# Patient Record
(demographics unavailable — no encounter records)

---

## 2024-11-19 NOTE — REASON FOR VISIT
[Hospital Follow-Up] : a hospital follow-up for [Headache] : headache [Patient] : patient [Mother] : mother [FreeTextEntry2] : ED admission for headache (11/19/2024)

## 2024-11-19 NOTE — PHYSICAL EXAM
[Well-appearing] : well-appearing [Normocephalic] : normocephalic [No dysmorphic facial features] : no dysmorphic facial features [No ocular abnormalities] : no ocular abnormalities [Neck supple] : neck supple [Lungs clear] : lungs clear [Heart sounds regular in rate and rhythm] : heart sounds regular in rate and rhythm [Soft] : soft [Straight] : straight [No deformities] : no deformities [Alert] : alert [Well related, good eye contact] : well related, good eye contact [Conversant] : conversant [Normal speech and language] : normal speech and language [Follows instructions well] : follows instructions well [Pupils reactive to light and accommodation] : pupils reactive to light and accommodation [Full extraocular movements] : full extraocular movements [No nystagmus] : no nystagmus [No papilledema] : no papilledema [Normal facial sensation to light touch] : normal facial sensation to light touch [No facial asymmetry or weakness] : no facial asymmetry or weakness [Gross hearing intact] : gross hearing intact [Equal palate elevation] : equal palate elevation [Good shoulder shrug] : good shoulder shrug [Normal tongue movement] : normal tongue movement [Midline tongue, no fasciculations] : midline tongue, no fasciculations [Normal axial and appendicular muscle tone] : normal axial and appendicular muscle tone [Gets up on table without difficulty] : gets up on table without difficulty [Normal finger tapping and fine finger movements] : normal finger tapping and fine finger movements [No abnormal involuntary movements] : no abnormal involuntary movements [5/5 strength in proximal and distal muscles of arms and legs] : 5/5 strength in proximal and distal muscles of arms and legs [Walks and runs well] : walks and runs well [2+ biceps] : 2+ biceps [Triceps] : triceps [Knee jerks] : knee jerks [Localizes LT and temperature] : localizes LT and temperature [No dysmetria on FTNT] : no dysmetria on FTNT [Good walking balance] : good walking balance [Normal gait] : normal gait [Able to tandem well] : able to tandem well [Negative Romberg] : negative Romberg [Saccadic and smooth pursuits intact] : saccadic and smooth pursuits intact

## 2024-11-19 NOTE — PLAN
[FreeTextEntry1] : 15 y M with a history of worsening migraines requiring evaluation in emergency room today.  Noted to have episode of LOC in setting of blood draw.    Patient's last MRI was preformed 2 years ago, will order repeat at this time since headaches have increased in frequency and do not resolve in the interim.   Episode of LOC likely vasovagal secondary to nervousness from IV placement.  Description more consistent with convulsive syncope and not likely epileptic seizure.  Therefore does not require repeat EEG at this time  Blood glucose slightly elevated at time of evaluation in emergency room today.  Will repeat fasting glucose to determine if further workup is required through endocrinology.

## 2024-11-19 NOTE — HISTORY OF PRESENT ILLNESS
[FreeTextEntry1] : Osvaldo presents in follow-up of chronic headaches and after having a severe headache this morning.  At baseline he does endorse an increase in frequency of headaches since the onset of  school this year. He reports no headaches over the summer holidays and a return of symptoms 2 weeks into the school year. He continues to endorse that since the end of September/October he has been experiencing a daily low grade chronic headache (which he does not treat) that then progresses to a migraine needing him to stay home and treat with abortive medication, such as Advil. Patient is currently taking multivitamins and migraine vitamins daily and Advil about x1 a week for headaches. Osvaldo is being seen today for an evaluation after an acute onset severe headache that began this morning which prompted him to go to the ED for management. Patient states he had woken up this morning around 6AM, after a comfortable sleep without awakenings, and noted to have an unusually severe generalized throbbing headache with accompanied nausea and dizziness. Denies vomiting. Patient states he suffers from chronic headaches, however, does not recall any to be this severe in intensity. Patient's mother had immediately taken him to the ED where they had attempted to obtain an IV access, but where unsuccessful and when the IV was taken out the patient had an episode of syncope. Mother describes the event as the patient had falling backwards in the bed, arms had become flexed to his chest with mild shaking and his eyes had rolled back, possibly an episode of syncopal conversion. Mother states this lasted for about 15-30secs and patient had returned to baseline and had no post-ictal state. She adds the patient had a similar episode last year during a blood draw, however there was no muscle flexion. Mother reports the patient then received medication for the headache, nausea and dizziness but is uncertain of what it was called, possibly Zofran, which helped somewhat to relief the patient's symptoms. Currently patient continues to endorse a mild headache and jaw pain.   Patient reports that yesterday he had generalized abdominal pain, which resulted in a decreased appetite and feeling of tiredness. Patient stayed home from school yesterday to rest in case the abdominal pain was due to an infectious cause. Patient denies any fevers, vomiting or diarrhea. No academic or new stressors.

## 2025-02-03 NOTE — HISTORY OF PRESENT ILLNESS
[FreeTextEntry1] : Osvaldo has done well with Amitriptyline.  He has had few headaches of unclear frequency but feels that the intensity is significantly reduced.  Typically the headache will self resolve in a matter of minutes.  There are no triggers.  He is not having any side effects from Amitriptyline.

## 2025-02-03 NOTE — PHYSICAL EXAM
[Well-appearing] : well-appearing [Normocephalic] : normocephalic [No dysmorphic facial features] : no dysmorphic facial features [No ocular abnormalities] : no ocular abnormalities [Neck supple] : neck supple [Lungs clear] : lungs clear [Heart sounds regular in rate and rhythm] : heart sounds regular in rate and rhythm [Soft] : soft [Straight] : straight [No deformities] : no deformities [Alert] : alert [Well related, good eye contact] : well related, good eye contact [Conversant] : conversant [Normal speech and language] : normal speech and language [Follows instructions well] : follows instructions well [Pupils reactive to light and accommodation] : pupils reactive to light and accommodation [Full extraocular movements] : full extraocular movements [Saccadic and smooth pursuits intact] : saccadic and smooth pursuits intact [No nystagmus] : no nystagmus [No papilledema] : no papilledema [Normal facial sensation to light touch] : normal facial sensation to light touch [No facial asymmetry or weakness] : no facial asymmetry or weakness [Gross hearing intact] : gross hearing intact [Equal palate elevation] : equal palate elevation [Good shoulder shrug] : good shoulder shrug [Normal tongue movement] : normal tongue movement [Midline tongue, no fasciculations] : midline tongue, no fasciculations [Normal axial and appendicular muscle tone] : normal axial and appendicular muscle tone [Gets up on table without difficulty] : gets up on table without difficulty [Normal finger tapping and fine finger movements] : normal finger tapping and fine finger movements [No abnormal involuntary movements] : no abnormal involuntary movements [5/5 strength in proximal and distal muscles of arms and legs] : 5/5 strength in proximal and distal muscles of arms and legs [Walks and runs well] : walks and runs well [2+ biceps] : 2+ biceps [Triceps] : triceps [Knee jerks] : knee jerks [Localizes LT and temperature] : localizes LT and temperature [No dysmetria on FTNT] : no dysmetria on FTNT [Good walking balance] : good walking balance [Normal gait] : normal gait [Able to tandem well] : able to tandem well [Negative Romberg] : negative Romberg

## 2025-02-03 NOTE — ASSESSMENT
[FreeTextEntry1] : 16-year-old with history of migraines significant reduction in frequency and intensity with current management.  Will continue amitriptyline and increase dose to 25 mg at night